# Patient Record
Sex: FEMALE | Race: WHITE | Employment: OTHER | URBAN - METROPOLITAN AREA
[De-identification: names, ages, dates, MRNs, and addresses within clinical notes are randomized per-mention and may not be internally consistent; named-entity substitution may affect disease eponyms.]

---

## 2017-10-19 PROBLEM — M26.609 TEMPOROMANDIBULAR JOINT DISORDER: Status: ACTIVE | Noted: 2017-03-27

## 2017-10-19 PROBLEM — R73.02 IMPAIRED GLUCOSE TOLERANCE: Status: ACTIVE | Noted: 2017-03-27

## 2017-10-19 PROBLEM — G62.9 PERIPHERAL NEUROPATHY: Status: ACTIVE | Noted: 2017-03-27

## 2017-10-19 PROBLEM — N95.1 PERIMENOPAUSAL: Status: ACTIVE | Noted: 2017-03-27

## 2017-10-19 PROBLEM — G57.10 MERALGIA PARESTHETICA: Status: ACTIVE | Noted: 2017-03-27

## 2017-10-19 PROBLEM — I10 HYPERTENSION: Status: ACTIVE | Noted: 2017-03-27

## 2017-10-19 PROBLEM — N60.11 FIBROCYSTIC BREAST CHANGES OF BOTH BREASTS: Status: ACTIVE | Noted: 2017-03-27

## 2017-10-19 PROBLEM — M54.12 CERVICAL RADICULOPATHY: Status: ACTIVE | Noted: 2017-03-27

## 2017-10-19 PROBLEM — H81.10 BENIGN PAROXYSMAL POSITIONAL VERTIGO: Status: ACTIVE | Noted: 2017-03-27

## 2017-10-19 PROBLEM — N60.12 FIBROCYSTIC BREAST CHANGES OF BOTH BREASTS: Status: ACTIVE | Noted: 2017-03-27

## 2017-10-19 PROBLEM — M85.80 OSTEOPENIA: Status: ACTIVE | Noted: 2017-03-27

## 2017-10-19 PROBLEM — I83.10 VARICOSE VEINS OF LOWER EXTREMITY WITH INFLAMMATION: Status: ACTIVE | Noted: 2017-03-27

## 2017-10-19 PROBLEM — M54.50 LOW BACK PAIN: Status: ACTIVE | Noted: 2017-03-27

## 2021-12-03 ENCOUNTER — OFFICE VISIT (OUTPATIENT)
Dept: UROLOGY | Age: 56
End: 2021-12-03
Payer: COMMERCIAL

## 2021-12-03 ENCOUNTER — HOSPITAL ENCOUNTER (OUTPATIENT)
Age: 56
Discharge: HOME | End: 2021-12-05
Payer: COMMERCIAL

## 2021-12-03 ENCOUNTER — HOSPITAL ENCOUNTER (OUTPATIENT)
Dept: GENERAL RADIOLOGY | Age: 56
Discharge: HOME | End: 2021-12-05
Payer: COMMERCIAL

## 2021-12-03 VITALS
BODY MASS INDEX: 36.65 KG/M2 | SYSTOLIC BLOOD PRESSURE: 111 MMHG | DIASTOLIC BLOOD PRESSURE: 75 MMHG | WEIGHT: 220 LBS | HEART RATE: 71 BPM | TEMPERATURE: 97.3 F | HEIGHT: 65 IN

## 2021-12-03 DIAGNOSIS — R10.9 FLANK PAIN: ICD-10-CM

## 2021-12-03 DIAGNOSIS — N20.1 URETERAL STONE: Primary | ICD-10-CM

## 2021-12-03 DIAGNOSIS — N20.1 URETERAL STONE: ICD-10-CM

## 2021-12-03 PROCEDURE — 74018 RADEX ABDOMEN 1 VIEW: CPT

## 2021-12-03 PROCEDURE — 99204 OFFICE O/P NEW MOD 45 MIN: CPT | Performed by: UROLOGY

## 2021-12-03 RX ORDER — OXYCODONE HYDROCHLORIDE AND ACETAMINOPHEN 5; 325 MG/1; MG/1
1 TABLET ORAL EVERY 6 HOURS PRN
Qty: 20 TABLET | Refills: 0 | Status: SHIPPED | OUTPATIENT
Start: 2021-12-03 | End: 2021-12-08

## 2021-12-03 ASSESSMENT — ENCOUNTER SYMPTOMS
WHEEZING: 0
EYE PAIN: 0
COUGH: 0
NAUSEA: 1
SHORTNESS OF BREATH: 0
DIARRHEA: 1
BACK PAIN: 0
ABDOMINAL PAIN: 0
VOMITING: 1
CONSTIPATION: 0
EYE REDNESS: 0

## 2021-12-03 NOTE — PROGRESS NOTES
Review of Systems   Constitutional: Negative for chills, fatigue and fever. Eyes: Negative for pain, redness and visual disturbance. Respiratory: Negative for cough, shortness of breath and wheezing. Cardiovascular: Negative for chest pain and leg swelling. Gastrointestinal: Positive for diarrhea, nausea and vomiting. Negative for abdominal pain and constipation. Genitourinary: Positive for difficulty urinating and flank pain. Negative for dysuria, frequency, hematuria and urgency. Musculoskeletal: Negative for back pain, joint swelling and myalgias. Skin: Negative for rash and wound. Neurological: Negative for dizziness, tremors and numbness. Hematological: Does not bruise/bleed easily.

## 2021-12-03 NOTE — LETTER
[address] [address]  Dept: [phone]  Dept Fax: [phone]        12/3/21    Patient: Aretha Tyler  YOB: 1965    Dear Buck Leung MD,    I had the pleasure of seeing one of your patients, Lexi Whitney today in the office today. Below are the relevant portions of my assessment and plan of care. IMPRESSION:  1. Ureteral stone    2. Flank pain         PLAN:  She has right flank pain. CT shows a 7mm stone in the proximal right ureter. Will schedule for URS and laser litho. Prescriptions Ordered:  No orders of the defined types were placed in this encounter. Orders Placed:  No orders of the defined types were placed in this encounter. Thank you for allowing me to participate in the care of this patient. I will keep you updated on this patient's follow up and I look forward to serving you and your patients again in the future.         Chandrika Purvis MD
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
## 2021-12-06 ENCOUNTER — HOSPITAL ENCOUNTER (OUTPATIENT)
Dept: GENERAL RADIOLOGY | Age: 56
Discharge: HOME | End: 2021-12-08
Payer: COMMERCIAL

## 2021-12-06 ENCOUNTER — HOSPITAL ENCOUNTER (OUTPATIENT)
Age: 56
Discharge: HOME | End: 2021-12-08
Payer: COMMERCIAL

## 2021-12-06 DIAGNOSIS — N20.0 KIDNEY STONES: Primary | ICD-10-CM

## 2021-12-06 DIAGNOSIS — N20.0 KIDNEY STONES: ICD-10-CM

## 2021-12-06 PROCEDURE — 74018 RADEX ABDOMEN 1 VIEW: CPT

## 2021-12-07 ENCOUNTER — TELEPHONE (OUTPATIENT)
Dept: UROLOGY | Age: 56
End: 2021-12-07

## 2021-12-07 NOTE — TELEPHONE ENCOUNTER
12/13/21 [address] will call Friday with information on what time to be there and what time surgery is. NPO midnight  STOP BLOOD THINNERS - NO NSAIDS TODAY    Patient given all information over the phone. Instructed to call the office with any questions or concerns.

## 2021-12-20 ENCOUNTER — TELEPHONE (OUTPATIENT)
Dept: UROLOGY | Age: 56
End: 2021-12-20

## 2022-01-05 NOTE — TELEPHONE ENCOUNTER
Patient would like to follow up with [facility] urology. Scheduled 2/9/22 1:50 w/ Dr. Conrado Mccain @ [facility], [place] [facility]. Demographics faxed. Patient notified.

## 2022-01-14 ENCOUNTER — HOSPITAL ENCOUNTER (OUTPATIENT)
Age: 57
Discharge: HOME | End: 2022-01-16
Payer: COMMERCIAL

## 2022-01-14 ENCOUNTER — HOSPITAL ENCOUNTER (OUTPATIENT)
Age: 57
Discharge: HOME | End: 2022-01-14
Payer: COMMERCIAL

## 2022-01-14 ENCOUNTER — HOSPITAL ENCOUNTER (OUTPATIENT)
Dept: GENERAL RADIOLOGY | Age: 57
Discharge: HOME | End: 2022-01-16
Payer: COMMERCIAL

## 2022-01-14 DIAGNOSIS — N20.0 KIDNEY STONE: ICD-10-CM

## 2022-01-14 PROCEDURE — 74018 RADEX ABDOMEN 1 VIEW: CPT

## 2022-01-14 PROCEDURE — 86403 PARTICLE AGGLUT ANTBDY SCRN: CPT

## 2022-01-14 PROCEDURE — 87086 URINE CULTURE/COLONY COUNT: CPT

## 2022-01-15 LAB
CULTURE: ABNORMAL
Lab: ABNORMAL
SPECIMEN DESCRIPTION: ABNORMAL

## 2022-01-17 DIAGNOSIS — N30.00 ACUTE CYSTITIS WITHOUT HEMATURIA: Primary | ICD-10-CM

## 2022-01-17 RX ORDER — AMOXICILLIN 500 MG/1
500 CAPSULE ORAL 3 TIMES DAILY
Qty: 21 CAPSULE | Refills: 0 | Status: SHIPPED | OUTPATIENT
Start: 2022-01-17 | End: 2022-01-24

## 2022-02-02 ENCOUNTER — HOSPITAL ENCOUNTER (OUTPATIENT)
Dept: CT IMAGING | Age: 57
Discharge: HOME | End: 2022-02-04
Payer: COMMERCIAL

## 2022-02-02 DIAGNOSIS — N20.1 URETERAL STONE: ICD-10-CM

## 2022-02-02 PROCEDURE — 74176 CT ABD & PELVIS W/O CONTRAST: CPT

## 2022-08-09 ENCOUNTER — HOSPITAL ENCOUNTER (OUTPATIENT)
Dept: GENERAL RADIOLOGY | Age: 57
Discharge: HOME | End: 2022-08-11
Payer: COMMERCIAL

## 2022-08-09 ENCOUNTER — HOSPITAL ENCOUNTER (OUTPATIENT)
Age: 57
Discharge: HOME | End: 2022-08-11
Payer: COMMERCIAL

## 2022-08-09 DIAGNOSIS — N20.0 KIDNEY STONE: ICD-10-CM

## 2022-08-09 PROCEDURE — 74018 RADEX ABDOMEN 1 VIEW: CPT

## 2023-07-12 ENCOUNTER — HOSPITAL ENCOUNTER (OUTPATIENT)
Age: 58
Setting detail: THERAPIES SERIES
Discharge: HOME | End: 2023-07-12
Payer: COMMERCIAL

## 2023-07-12 PROCEDURE — 97161 PT EVAL LOW COMPLEX 20 MIN: CPT

## 2023-07-12 PROCEDURE — 97110 THERAPEUTIC EXERCISES: CPT
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
## 2023-07-14 ENCOUNTER — HOSPITAL ENCOUNTER (OUTPATIENT)
Age: 58
Setting detail: THERAPIES SERIES
Discharge: HOME | End: 2023-07-14
Payer: COMMERCIAL

## 2023-07-14 PROCEDURE — 97110 THERAPEUTIC EXERCISES: CPT

## 2023-07-14 PROCEDURE — 97035 APP MDLTY 1+ULTRASOUND EA 15: CPT

## 2023-07-14 NOTE — FLOWSHEET NOTE
needling        [] 1 or 2 muscles        [] 3 or more muscles     []  Other     Total Treatment time 45      Time In:1610            Time Out: 1700    Electronically signed by:  Rodriguez Vela PTA

## 2023-07-18 ENCOUNTER — HOSPITAL ENCOUNTER (OUTPATIENT)
Age: 58
Setting detail: THERAPIES SERIES
Discharge: HOME | End: 2023-07-18
Payer: COMMERCIAL

## 2023-07-18 PROCEDURE — 97140 MANUAL THERAPY 1/> REGIONS: CPT

## 2023-07-18 PROCEDURE — 97110 THERAPEUTIC EXERCISES: CPT

## 2023-07-18 PROCEDURE — 97035 APP MDLTY 1+ULTRASOUND EA 15: CPT

## 2023-07-18 NOTE — FLOWSHEET NOTE
therapy services in order to: Decrease pain, improve shoulder ROM/strength and return to normal function including sleeping, reaching overhead/behind back, pushing, bathing/dressing, lifting with minimal pain/difficulty. STG: (to be met in 12 treatments)  ? Pain: Right shoulder to 0-3/10 to improve sleep. ? ROM: right shoulder to WNLs to improve ADLs. ? Strength: right shoulder to improve lifting. ? Function: SPADI to 20/120  Patient to be independent with home exercise program as demonstrated by performance with correct form without cues. LTG: (to be met in 24 treatments)  Patient will return to normal activity including sleeping, reaching overhead/behind back, pushing, bathing/dressing, lifting with minimal pain/difficulty. Pt. Education:  [x] Yes  [] No  [x] Reviewed Prior HEP/Ed  Method of Education: [x] Verbal  [x] Demo  [] Written  Comprehension of Education:  [x] Verbalizes understanding. [] Demonstrates understanding. [x] Needs review. [] Demonstrates/verbalizes HEP/Ed previously given. Access Code: [email or web address]  URL: [email or web address]  Date: 07/12/2023  Prepared by: Sarabjit Armenta     Exercises  - Circular Shoulder Pendulum with Table Support  - 1-3 x daily - 7 x weekly - 1 sets - 20 reps  - Standing Bent Over Single Arm Scapular Row with Table Support  - 1-2 x daily - 7 x weekly - 2 sets - 10 reps  - Seated Scapular Retraction  - 1-3 x daily - 7 x weekly - 2 sets - 10 reps  - Seated Shoulder Inferior Glide  - 1-3 x daily - 7 x weekly - 1 sets - 10 reps - 5 sec hold  - Supine Shoulder Flexion Extension AAROM with Dowel  - 1-2 x daily - 7 x weekly - 2 sets - 10 reps  - Supine Scapular Protraction in Flexion with Dumbbells  - 1-2 x daily - 7 x weekly - 2 sets - 10 reps    Plan: [x] Continue per plan of care.    [] Other:      Treatment Charges: Mins Units   [x]  Modalities   8 1   [x]  Ther Exercise 27 2   [x]  Manual Therapy 6 0   []  Ther Activities     []  Aquatics     []

## 2023-07-19 ENCOUNTER — HOSPITAL ENCOUNTER (OUTPATIENT)
Age: 58
Setting detail: THERAPIES SERIES
Discharge: HOME | End: 2023-07-19
Payer: COMMERCIAL

## 2023-07-19 PROCEDURE — 97035 APP MDLTY 1+ULTRASOUND EA 15: CPT

## 2023-07-19 PROCEDURE — 97110 THERAPEUTIC EXERCISES: CPT

## 2023-07-21 ENCOUNTER — HOSPITAL ENCOUNTER (OUTPATIENT)
Age: 58
Setting detail: THERAPIES SERIES
Discharge: HOME | End: 2023-07-21
Payer: COMMERCIAL

## 2023-07-21 PROCEDURE — 97110 THERAPEUTIC EXERCISES: CPT

## 2023-07-21 PROCEDURE — 97035 APP MDLTY 1+ULTRASOUND EA 15: CPT

## 2023-07-25 ENCOUNTER — HOSPITAL ENCOUNTER (OUTPATIENT)
Age: 58
Setting detail: THERAPIES SERIES
Discharge: HOME | End: 2023-07-25
Payer: COMMERCIAL

## 2023-07-25 PROCEDURE — 97110 THERAPEUTIC EXERCISES: CPT

## 2023-07-25 PROCEDURE — 97035 APP MDLTY 1+ULTRASOUND EA 15: CPT

## 2023-07-25 NOTE — FLOWSHEET NOTE
Passive shld ER/IR WNLs (ER painful end-range). Much improved active flexion standing (approx 120 deg). Still some pain pain with extension. [] No change     [] Other:    [x] Patient would continue to benefit from skilled physical therapy services in order to: Decrease pain, improve shoulder ROM/strength and return to normal function including sleeping, reaching overhead/behind back, pushing, bathing/dressing, lifting with minimal pain/difficulty. STG: (to be met in 12 treatments)  ? Pain: Right shoulder to 0-3/10 to improve sleep. ? ROM: right shoulder to WNLs to improve ADLs. ? Strength: right shoulder to improve lifting. ? Function: SPADI to 120  Patient to be independent with home exercise program as demonstrated by performance with correct form without cues. LTG: (to be met in 24 treatments)  Patient will return to normal activity including sleeping, reaching overhead/behind back, pushing, bathing/dressing, lifting with minimal pain/difficulty. Pt. Education:  [x] Yes  [] No  [x] Reviewed Prior HEP/Ed  Method of Education: [x] Verbal  [x] Demo  [] Written  Comprehension of Education:  [x] Verbalizes understanding. [] Demonstrates understanding. [x] Needs review. [] Demonstrates/verbalizes HEP/Ed previously given. Access Code: [email or web address]  URL: [email or web address]  Date: 2023  Prepared by:  Floridalma Marquez     Exercises  - Circular Shoulder Pendulum with Table Support  - 1-3 x daily - 7 x weekly - 1 sets - 20 reps  - Standing Bent Over Single Arm Scapular Row with Table Support  - 1-2 x daily - 7 x weekly - 2 sets - 10 reps  - Seated Scapular Retraction  - 1-3 x daily - 7 x weekly - 2 sets - 10 reps  - Seated Shoulder Inferior Glide  - 1-3 x daily - 7 x weekly - 1 sets - 10 reps - 5 sec hold  - Supine Shoulder Flexion Extension AAROM with Dowel  - 1-2 x daily - 7 x weekly - 2 sets - 10 reps  - Supine Scapular Protraction in Flexion with Dumbbells  - 1-2 x daily - 7 x

## 2023-07-28 ENCOUNTER — HOSPITAL ENCOUNTER (OUTPATIENT)
Age: 58
Setting detail: THERAPIES SERIES
Discharge: HOME | End: 2023-07-28
Payer: COMMERCIAL

## 2023-07-28 PROCEDURE — 97035 APP MDLTY 1+ULTRASOUND EA 15: CPT

## 2023-07-28 PROCEDURE — 97110 THERAPEUTIC EXERCISES: CPT

## 2023-07-28 NOTE — FLOWSHEET NOTE
x daily - 7 x weekly - 1 sets - 10 reps - 5 sec hold  - Supine Shoulder Flexion Extension AAROM with Dowel  - 1-2 x daily - 7 x weekly - 2 sets - 10 reps  - Supine Scapular Protraction in Flexion with Dumbbells  - 1-2 x daily - 7 x weekly - 2 sets - 10 reps    Plan: [x] Continue per plan of care.    [] Other:      Treatment Charges: Mins Units   [x]  Modalities  US                     ICE 8  10 1  0   [x]  Ther Exercise 40 2   [x]  Manual Therapy 6 0   []  Ther Activities     []  Aquatics     []  Neuromuscular     [] Vasocompression     [] Gait Training     [] Dry needling        [] 1 or 2 muscles        [] 3 or more muscles     []  Other     Total Treatment time 50 3     Time In: 9438    Time Out: 6657    Electronically signed by:  Caprice Ceron PTA

## 2023-08-01 NOTE — FLOWSHEET NOTE
[x] [facility]) [facility]  [address] [place], [address]        Physical Therapy Cancel/No Show note    Date: 2023  Patient: Alanis Elizabeth  : 1965  MRN: [id number]    Visit Count:   Cancels/No Shows to date: 0    For today's appointment patient:    [x]  Cancelled    [] Rescheduled appointment    [] No-show     Reason given by patient:    []  Patient ill    []  Conflicting appointment    [] No transportation      [] Conflict with work    [] No reason given    [] Weather related    [] COVID-19    [x] Other:      Comments:  patient states she went back to her doctor who is referring her to an ortho surgeon for evaluation and to hold from PT for now. [] Next appointment was confirmed    Electronically signed by:  Massimo Moe PT

## 2023-08-02 ENCOUNTER — HOSPITAL ENCOUNTER (OUTPATIENT)
Age: 58
Setting detail: THERAPIES SERIES
Discharge: HOME | End: 2023-08-02

## 2023-08-04 ENCOUNTER — HOSPITAL ENCOUNTER (OUTPATIENT)
Age: 58
Discharge: HOME | End: 2023-08-04
Payer: COMMERCIAL

## 2023-08-04 ENCOUNTER — HOSPITAL ENCOUNTER (OUTPATIENT)
Dept: GENERAL RADIOLOGY | Age: 58
End: 2023-08-04
Payer: COMMERCIAL

## 2023-08-04 DIAGNOSIS — N20.0 CALCULUS OF KIDNEY: ICD-10-CM

## 2023-08-04 PROCEDURE — 74018 RADEX ABDOMEN 1 VIEW: CPT
